# Patient Record
Sex: FEMALE | Race: WHITE | NOT HISPANIC OR LATINO | URBAN - METROPOLITAN AREA
[De-identification: names, ages, dates, MRNs, and addresses within clinical notes are randomized per-mention and may not be internally consistent; named-entity substitution may affect disease eponyms.]

---

## 2018-01-08 ENCOUNTER — FOLLOW UP ESTABLISHED (OUTPATIENT)
Dept: URBAN - METROPOLITAN AREA CLINIC 51 | Facility: CLINIC | Age: 83
End: 2018-01-08
Payer: MEDICARE

## 2018-01-08 DIAGNOSIS — H34.8312 BRANCH RETINAL VEIN OCCLUSION, STABLE, RIGHT EYE: ICD-10-CM

## 2018-01-08 DIAGNOSIS — Z96.1 PRESENCE OF INTRAOCULAR LENS: ICD-10-CM

## 2018-01-08 PROCEDURE — 92134 CPTRZ OPH DX IMG PST SGM RTA: CPT | Performed by: OPTOMETRIST

## 2018-01-08 PROCEDURE — 92014 COMPRE OPH EXAM EST PT 1/>: CPT | Performed by: OPTOMETRIST

## 2018-01-08 ASSESSMENT — VISUAL ACUITY
OS: 20/30
OD: 20/60

## 2018-01-08 ASSESSMENT — INTRAOCULAR PRESSURE
OS: 12
OD: 12

## 2021-03-23 ENCOUNTER — NEW PATIENT (OUTPATIENT)
Dept: URBAN - METROPOLITAN AREA CLINIC 51 | Facility: CLINIC | Age: 86
End: 2021-03-23
Payer: MEDICARE

## 2021-03-23 DIAGNOSIS — H35.363 DRUSEN (DEGENERATIVE) OF MACULA, BILATERAL: ICD-10-CM

## 2021-03-23 DIAGNOSIS — H35.011 CHANGES IN RETINAL VASCULAR APPEARANCE, RIGHT EYE: ICD-10-CM

## 2021-03-23 DIAGNOSIS — H53.2 DIPLOPIA: ICD-10-CM

## 2021-03-23 PROCEDURE — 92134 CPTRZ OPH DX IMG PST SGM RTA: CPT | Performed by: OPTOMETRIST

## 2021-03-23 PROCEDURE — 92014 COMPRE OPH EXAM EST PT 1/>: CPT | Performed by: OPTOMETRIST

## 2021-03-23 ASSESSMENT — KERATOMETRY
OD: 43.08
OS: 43.77

## 2021-03-23 ASSESSMENT — VISUAL ACUITY
OS: 20/30
OD: 20/70

## 2021-03-23 ASSESSMENT — INTRAOCULAR PRESSURE
OD: 10
OS: 9

## 2023-02-22 ENCOUNTER — OFFICE VISIT (OUTPATIENT)
Dept: URBAN - METROPOLITAN AREA CLINIC 51 | Facility: CLINIC | Age: 88
End: 2023-02-22
Payer: MEDICARE

## 2023-02-22 DIAGNOSIS — H18.593 OTHER HEREDITARY CORNEAL DYSTROPHIES: ICD-10-CM

## 2023-02-22 DIAGNOSIS — H53.2 DIPLOPIA: Primary | ICD-10-CM

## 2023-02-22 DIAGNOSIS — H34.8312 TRIBUTARY (BRANCH) RETINAL VEIN OCCLUSION, RIGHT EYE, STABLE: ICD-10-CM

## 2023-02-22 DIAGNOSIS — H35.3131 NONEXUDATIVE MACULAR DEGENERATION, EARLY DRY STAGE, BILATERAL: ICD-10-CM

## 2023-02-22 PROCEDURE — 92134 CPTRZ OPH DX IMG PST SGM RTA: CPT | Performed by: OPTOMETRIST

## 2023-02-22 PROCEDURE — 99214 OFFICE O/P EST MOD 30 MIN: CPT | Performed by: OPTOMETRIST

## 2023-02-22 ASSESSMENT — KERATOMETRY
OD: 42.88
OS: 43.88

## 2023-02-22 ASSESSMENT — INTRAOCULAR PRESSURE
OD: 10
OS: 10

## 2023-02-22 ASSESSMENT — VISUAL ACUITY: OS: 20/30

## 2023-02-22 NOTE — IMPRESSION/PLAN
Impression: Nonexudative macular degeneration, early dry stage, bilateral: H35.1201.  Plan: start [facility] handout provided

## 2023-02-22 NOTE — IMPRESSION/PLAN
Impression: Diplopia Plan: Large angle vertical and horizontal
refer pt to [address] [place] [address] if desires glasses, pt understands and defers [facility] if wishes referral for surgery

## 2023-02-22 NOTE — IMPRESSION/PLAN
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  Plan: use AT [facility]

## 2023-04-12 ENCOUNTER — OFFICE VISIT (OUTPATIENT)
Dept: URBAN - METROPOLITAN AREA CLINIC 52 | Facility: CLINIC | Age: 88
End: 2023-04-12
Payer: MEDICARE

## 2023-04-12 DIAGNOSIS — H52.4 PRESBYOPIA: Primary | ICD-10-CM

## 2023-04-12 DIAGNOSIS — H53.2 DIPLOPIA: ICD-10-CM

## 2023-04-12 PROCEDURE — 92012 INTRM OPH EXAM EST PATIENT: CPT | Performed by: OPTOMETRIST

## 2023-04-12 ASSESSMENT — VISUAL ACUITY
OS: 20/40
OD: 20/100

## 2023-04-12 ASSESSMENT — INTRAOCULAR PRESSURE
OS: 14
OD: 11

## 2023-04-12 NOTE — IMPRESSION/PLAN
Impression: Diplopia: H53.2. Plan: Diplopia relieved with 18BO and 1 BD OS, patient accepted up to 19BO and 1.5BD OS.

## 2023-04-12 NOTE — IMPRESSION/PLAN
Impression: Presbyopia: H52.4. Plan: Discussed need for reading glasses/add power, educated patient on bifocal, trifocal, and progressive options and expected adaptation period. Updated and released SRx today. Patient to use caution during daily activities until adaptation occurs. Continue to monitor annually. Patient prefers to transfer care to this facility, will see patient annually.